# Patient Record
Sex: FEMALE | Race: OTHER | URBAN - METROPOLITAN AREA
[De-identification: names, ages, dates, MRNs, and addresses within clinical notes are randomized per-mention and may not be internally consistent; named-entity substitution may affect disease eponyms.]

---

## 2018-10-23 ENCOUNTER — HOSPITAL ENCOUNTER (OUTPATIENT)
Dept: LAB | Age: 53
Discharge: HOME | End: 2018-10-23

## 2018-10-23 ENCOUNTER — OFFICE VISIT (OUTPATIENT)
Dept: FAMILY MEDICINE CLINIC | Age: 53
End: 2018-10-23

## 2018-10-23 VITALS
BODY MASS INDEX: 32.82 KG/M2 | HEART RATE: 74 BPM | RESPIRATION RATE: 20 BRPM | SYSTOLIC BLOOD PRESSURE: 136 MMHG | DIASTOLIC BLOOD PRESSURE: 90 MMHG | OXYGEN SATURATION: 98 % | HEIGHT: 65 IN | TEMPERATURE: 98.2 F | WEIGHT: 197 LBS

## 2018-10-23 DIAGNOSIS — B34.9 VIRAL ILLNESS: Primary | ICD-10-CM

## 2018-10-23 LAB
ANION GAP SERPL CALC-SCNC: 8 MMOL/L (ref 3–18)
BASOPHILS # BLD: 0 K/UL (ref 0–0.1)
BASOPHILS NFR BLD: 0 % (ref 0–2)
BUN SERPL-MCNC: 11 MG/DL (ref 7–18)
BUN/CREAT SERPL: 17 (ref 12–20)
CALCIUM SERPL-MCNC: 9.1 MG/DL (ref 8.5–10.1)
CHLORIDE SERPL-SCNC: 101 MMOL/L (ref 100–108)
CHOLEST SERPL-MCNC: 169 MG/DL
CO2 SERPL-SCNC: 31 MMOL/L (ref 21–32)
CREAT SERPL-MCNC: 0.66 MG/DL (ref 0.6–1.3)
DIFFERENTIAL METHOD BLD: ABNORMAL
EOSINOPHIL # BLD: 0.1 K/UL (ref 0–0.4)
EOSINOPHIL NFR BLD: 2 % (ref 0–5)
ERYTHROCYTE [DISTWIDTH] IN BLOOD BY AUTOMATED COUNT: 14.4 % (ref 11.6–14.5)
GLUCOSE SERPL-MCNC: 94 MG/DL (ref 74–99)
HBA1C MFR BLD: 6 % (ref 4.2–5.6)
HCT VFR BLD AUTO: 39.6 % (ref 35–45)
HDLC SERPL-MCNC: 76 MG/DL (ref 40–60)
HDLC SERPL: 2.2 {RATIO} (ref 0–5)
HGB BLD-MCNC: 12 G/DL (ref 12–16)
LDLC SERPL CALC-MCNC: 69 MG/DL (ref 0–100)
LIPID PROFILE,FLP: ABNORMAL
LYMPHOCYTES # BLD: 1.7 K/UL (ref 0.9–3.6)
LYMPHOCYTES NFR BLD: 27 % (ref 21–52)
MCH RBC QN AUTO: 24.4 PG (ref 24–34)
MCHC RBC AUTO-ENTMCNC: 30.3 G/DL (ref 31–37)
MCV RBC AUTO: 80.5 FL (ref 74–97)
MONOCYTES # BLD: 0.3 K/UL (ref 0.05–1.2)
MONOCYTES NFR BLD: 5 % (ref 3–10)
NEUTS SEG # BLD: 4.2 K/UL (ref 1.8–8)
NEUTS SEG NFR BLD: 66 % (ref 40–73)
PLATELET # BLD AUTO: 301 K/UL (ref 135–420)
PMV BLD AUTO: 9.6 FL (ref 9.2–11.8)
POTASSIUM SERPL-SCNC: 4.1 MMOL/L (ref 3.5–5.5)
RBC # BLD AUTO: 4.92 M/UL (ref 4.2–5.3)
SODIUM SERPL-SCNC: 140 MMOL/L (ref 136–145)
TRIGL SERPL-MCNC: 120 MG/DL (ref ?–150)
TSH SERPL DL<=0.05 MIU/L-ACNC: 3 UIU/ML (ref 0.36–3.74)
VLDLC SERPL CALC-MCNC: 24 MG/DL
WBC # BLD AUTO: 6.4 K/UL (ref 4.6–13.2)

## 2018-10-23 PROCEDURE — 84443 ASSAY THYROID STIM HORMONE: CPT | Performed by: FAMILY MEDICINE

## 2018-10-23 PROCEDURE — 85025 COMPLETE CBC W/AUTO DIFF WBC: CPT | Performed by: FAMILY MEDICINE

## 2018-10-23 PROCEDURE — 80048 BASIC METABOLIC PNL TOTAL CA: CPT | Performed by: FAMILY MEDICINE

## 2018-10-23 PROCEDURE — 80061 LIPID PANEL: CPT | Performed by: FAMILY MEDICINE

## 2018-10-23 PROCEDURE — 83036 HEMOGLOBIN GLYCOSYLATED A1C: CPT | Performed by: FAMILY MEDICINE

## 2018-10-23 RX ORDER — METFORMIN HYDROCHLORIDE 500 MG/1
TABLET ORAL 2 TIMES DAILY WITH MEALS
COMMUNITY
End: 2018-10-23 | Stop reason: SDUPTHER

## 2018-10-23 RX ORDER — VALSARTAN 80 MG/1
80 TABLET ORAL DAILY
Qty: 30 TAB | Refills: 2 | Status: SHIPPED | OUTPATIENT
Start: 2018-10-23 | End: 2019-01-07 | Stop reason: SDUPTHER

## 2018-10-23 RX ORDER — VALSARTAN 80 MG/1
TABLET ORAL DAILY
COMMUNITY
End: 2018-10-23 | Stop reason: SDUPTHER

## 2018-10-23 RX ORDER — CARBAMAZEPINE 200 MG/1
200 TABLET ORAL 3 TIMES DAILY
COMMUNITY
End: 2018-10-23 | Stop reason: SDUPTHER

## 2018-10-23 RX ORDER — METFORMIN HYDROCHLORIDE 500 MG/1
500 TABLET ORAL 2 TIMES DAILY WITH MEALS
Qty: 60 TAB | Refills: 2 | Status: SHIPPED | OUTPATIENT
Start: 2018-10-23 | End: 2019-01-07 | Stop reason: SDUPTHER

## 2018-10-23 RX ORDER — CARBAMAZEPINE 200 MG/1
200 TABLET ORAL 2 TIMES DAILY
Qty: 60 TAB | Refills: 2 | Status: SHIPPED | OUTPATIENT
Start: 2018-10-23 | End: 2019-01-07 | Stop reason: SDUPTHER

## 2018-10-23 NOTE — PROGRESS NOTES
Chief Complaint Patient presents with  New Patient  
  establish healthcare  Cough  
  x 1 week - no fever- headache, vomited x2,  
 
1. When and where did you last receive medical care? Yes When: 8/2018  Primary Care - Flu symptoms 2. When and where did you last have preventive care such as mammogram, pap smears or colon screening? Past due MAMMO and PAP 3. What is your current living situation (for example, live alone, live in home with immediate family members)? With sister 4. Do you have any problems with communication such trouble seeing, hearing, or understanding instructions? Yes 
 
5. Do you have an advance directive? This is a document that you can give to family members with instructions for how you would want them to make health care decisions for you if you were unable to speak for yourself. (For example, unconscious, delerious)Yes [place] only PMH/FH/Social Hx reviewed and updated as needed Applicable screenings reviewed and updated as needed Medication reconciliation performed. Patient does not know need medication refills. Health Maintenance reviewed.

## 2018-10-23 NOTE — PROGRESS NOTES
HPI 
Bailey Valencia is a 47 y.o. female being seen today for Chief Complaint Patient presents with  New Patient  
  establish healthcare  Cough  
  x 1 week - no fever- headache, vomited x2,  
. IOV for this pt to care a Merrill Sanchez. she states that she has body aches. Everyone in her house is sick. 4 days of symptoms. Started with headache and vomiting. That is better but now she has cough. Some green phelgm. Is clearing up. Has hypertension and prediabetes. Does need refills. Also takes tegretol for unclear reasons. Was prescribed in [address] [place] [address]. This patient interview was conducted with certified , wes swenson Past Medical History:  
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 [address] 2013 Type 2  
 Hypertension 2010 ROS Patient states that she is feeling well. Denies complaints of chest pain, shortness of breath, swelling of legs, dizziness or weakness. she denies nausea, vomiting or diarrhea. Current Outpatient Medications Medication Sig  
 guaifenesin/DM/pseudoephedrine (TUSSIN CF PO) Take  by mouth.  valsartan (DIOVAN) 80 mg tablet Take  by mouth daily.  metFORMIN (GLUCOPHAGE) 500 mg tablet Take  by mouth two (2) times daily (with meals).  carBAMazepine (TEGRETOL) 200 mg tablet Take 200 mg by mouth three (3) times daily. No current facility-administered medications for this visit. PE Visit Vitals /90 (BP 1 Location: Left arm, BP Patient Position: Sitting) Pulse 74 Temp 98.2 °F (36.8 °C) (Temporal) Resp 20 Ht 5' 4.5\" (1.638 m) Wt 197 lb (89.4 kg) LMP 10/10/2003 (Within Months) SpO2 98% BMI 33.29 kg/m² Alert and oriented with normal mood and affect. she is well developed and well nourished . Lungs are clear without wheezing. Heart rate is regular without murmurs or gallops. There is no lower extremity edema. No results found for this or any previous visit. Assessment and Plan: ICD-10-CM ICD-9-CM 1. Viral illness B34.9 079.99 Sx care Follow up as needed Ani Francis MD

## 2019-01-07 RX ORDER — VALSARTAN 80 MG/1
80 TABLET ORAL DAILY
Qty: 30 TAB | Refills: 2 | Status: SHIPPED | OUTPATIENT
Start: 2019-01-07 | End: 2019-06-26 | Stop reason: SDUPTHER

## 2019-01-07 RX ORDER — CARBAMAZEPINE 200 MG/1
200 TABLET ORAL 2 TIMES DAILY
Qty: 60 TAB | Refills: 2 | Status: SHIPPED | OUTPATIENT
Start: 2019-01-07

## 2019-01-07 RX ORDER — METFORMIN HYDROCHLORIDE 500 MG/1
500 TABLET ORAL 2 TIMES DAILY WITH MEALS
Qty: 60 TAB | Refills: 2 | Status: SHIPPED | OUTPATIENT
Start: 2019-01-07 | End: 2019-06-26 | Stop reason: SDUPTHER

## 2019-06-26 RX ORDER — METFORMIN HYDROCHLORIDE 500 MG/1
TABLET ORAL
Qty: 60 TAB | Refills: 0 | Status: SHIPPED | OUTPATIENT
Start: 2019-06-26

## 2019-06-26 RX ORDER — VALSARTAN 80 MG/1
TABLET ORAL
Qty: 30 TAB | Refills: 0 | Status: SHIPPED | OUTPATIENT
Start: 2019-06-26